# Patient Record
Sex: FEMALE | Race: WHITE | URBAN - METROPOLITAN AREA
[De-identification: names, ages, dates, MRNs, and addresses within clinical notes are randomized per-mention and may not be internally consistent; named-entity substitution may affect disease eponyms.]

---

## 2020-04-10 ENCOUNTER — VIRTUAL VISIT (OUTPATIENT)
Dept: FAMILY MEDICINE CLINIC | Age: 25
End: 2020-04-10

## 2020-04-10 VITALS — HEIGHT: 69 IN | DIASTOLIC BLOOD PRESSURE: 107 MMHG | SYSTOLIC BLOOD PRESSURE: 154 MMHG | HEART RATE: 99 BPM

## 2020-04-10 DIAGNOSIS — F90.9 HYPERACTIVE: ICD-10-CM

## 2020-04-10 DIAGNOSIS — F41.8 DEPRESSION WITH ANXIETY: ICD-10-CM

## 2020-04-10 DIAGNOSIS — Z76.89 ENCOUNTER TO ESTABLISH CARE: ICD-10-CM

## 2020-04-10 DIAGNOSIS — I10 ESSENTIAL HYPERTENSION: Primary | ICD-10-CM

## 2020-04-10 DIAGNOSIS — E66.09 OBESITY DUE TO EXCESS CALORIES WITH SERIOUS COMORBIDITY, UNSPECIFIED CLASSIFICATION: ICD-10-CM

## 2020-04-10 DIAGNOSIS — E16.2 HYPOGLYCEMIA: ICD-10-CM

## 2020-04-10 DIAGNOSIS — G89.29 CHRONIC MUSCULOSKELETAL PAIN: ICD-10-CM

## 2020-04-10 DIAGNOSIS — M79.18 CHRONIC MUSCULOSKELETAL PAIN: ICD-10-CM

## 2020-04-10 RX ORDER — CYCLOBENZAPRINE HCL 10 MG
TABLET ORAL
COMMUNITY
Start: 2020-03-28

## 2020-04-10 RX ORDER — LISINOPRIL 10 MG/1
10 TABLET ORAL DAILY
Qty: 90 TAB | Refills: 1 | Status: SHIPPED | OUTPATIENT
Start: 2020-04-10

## 2020-04-10 RX ORDER — NORGESTIMATE AND ETHINYL ESTRADIOL 0.25-0.035
KIT ORAL
COMMUNITY
Start: 2020-03-08

## 2020-04-10 NOTE — PROGRESS NOTES
Linh Bojorquez [facility] Note      Subjective:     Chief Complaint   Patient presents with   [facility].     Sudeep Aleman is a 31y.o. year old female who presents for virtual evaluation of the following:      Establishment of Care:  Previous PCP: Dr. Hodan Hernandez in [place], last seen > 1 year  No care team member to display   Dentist- Uli Vigil- Dr. Mishel Rowan      PMH:   Hypertension:  Chronic.  Diagnosed in childhood  Home monitoring:   Previous Treatment: Diltiazem 300mg- had joint heat with that, lisinopril with no known side effect  Not adhering to strict low salt diet  Mother and father with HTN  Co-morbidities: Obesity  BP Readings from Last 3 Encounters:   04/10/20 (!) 154/107     Fibromyalgia/ Chronic Pain:  Diagnosed 2013 by PCP  Current Sx: muscle pain, itching in foot, fatigue, pain in joint  Treatment: None  Previous Treatment: Name unknown  Triggers: extensive chores and physical exertion  ADL Assessment 4/10/2020   Feeding yourself No Help Needed   Getting from bed to chair No Help Needed   Getting dressed No Help Needed   Bathing or showering No Help Needed   Walk across the room (includes cane/walker) No Help Needed   Using the telphone No Help Needed   Taking your medications No Help Needed   Preparing meals No Help Needed   Managing money (expenses/bills) No Help Needed   Moderately strenuous housework (laundry) No Help Needed   Shopping for personal items (toiletries/medicines) No Help Needed   Shopping for groceries No Help Needed   Driving No Help Needed   Climbing a flight of stairs No Help Needed   Getting to places beyond walking distances No Help Needed         Depression/ Anxiety:   Diagnosed in childhood  Previous Tx: name unknown in [employer]  - has taken lorazepam last 1 year ago, previous use once per month  Has 3 panic attacks in the past 1 year, self limited  Therapist: None currently but has helped int he past  FALGUNI: 6  3 most recent PHQ Screens 4/10/2020   Little interest or pleasure in doing things Not at all   Feeling down, depressed, irritable, or hopeless Not at all   Total Score PHQ 2 0       ADHD:   Chronic  Diagnosed 2 years ago by PCP  Current Symptoms: fidgety, trouble sitting still  No neuropsychological testing  Previous Treatment: Strattera 25mg  - ran out 11/2019    Hypoglycemia:   Onset more than 2 years ago  Etiology unclear  Treatment: glucose tabs  - Has a service dog who monitor her sugars at night  Last low blood sugar 2 weeks ago 63 at night  Occasionally has low sugars during the day   Has passed out from sugars in the remote past  No blood glucose log available     Obesity:   Patient perception: \"its been coming down\"  Diet: less processed food  Dinner was tortellini with green beans  Lunch was grilled chicken sandwich and coldslaw  Breakfast yogurt  -No food log in office  Activity: None recently. Previous gym  Lost 50 in 6 months  Reports current weigh 250  Treatment:None  Previous Treatment: Name unknown  Last Weight Metrics:  Weight Loss Metrics 4/10/2020   Today's Wt -       Acute Concerns:  Medication refills      Social:   Employment- works night shift [employer]   Lives with boyfriend      Review of Systems   Pertinent positives and negative per HPI. All other systems  reviewed are negative for a Comprehensive ROS (10+).        Past Medical History:   Diagnosis Date    ADHD     Anxiety     Fibromyalgia     Hypertension     Hypoglycemia         Social History     Socioeconomic History    Marital status: SINGLE     Spouse name: Not on file    Number of children: Not on file    Years of education: Not on file    Highest education level: Not on file   Occupational History    Not on file   Social Needs    Financial resource strain: Not on file    Food insecurity     Worry: Not on file     Inability: Not on file    Transportation needs     Medical: Not on file     Non-medical: Not on file   Tobacco Use    Smoking status: Former Smoker     Types: Cigarettes     Last attempt to quit: 2013     Years since quittin.2    Smokeless tobacco: Never Used   Substance and Sexual Activity    Alcohol use: Yes     Frequency: 2-3 times a week     Drinks per session: 1 or 2    Drug use: Not on file    Sexual activity: Not on file   Lifestyle    Physical activity     Days per week: Not on file     Minutes per session: Not on file    Stress: Not on file   Relationships    Social connections     Talks on phone: Not on file     Gets together: Not on file     Attends Latter-day service: Not on file     Active member of club or organization: Not on file     Attends meetings of clubs or organizations: Not on file     Relationship status: Not on file    Intimate partner violence     Fear of current or ex partner: Not on file     Emotionally abused: Not on file     Physically abused: Not on file     Forced sexual activity: Not on file   Other Topics Concern    Not on file   Social History Narrative    Not on file       No family history on file. Current Outpatient Medications   Medication Sig    Sprintec, 28, 0.25-35 mg-mcg tab     cyclobenzaprine (FLEXERIL) 10 mg tablet      No current facility-administered medications for this visit. Objective:     Vitals:    04/10/20 1047 04/10/20 1130   BP: (!) 153/105 (!) 154/107   Pulse: 87 99   Height: 5' 9\" (1.753 m)      Vitals measurement visualized and verified by provider or nursing staff       Physical Examination:  General: Alert, cooperative, no distress, appears stated age. Obese  Eyes: Conjunctivae clear. Pupils equally round. Extraocular muscles intact. Ears: Normal appearing external ear   Nose: Nares normal appearing  Mouth/Throat: Lips, mucosa, and tongue normal. Moist mucous membranes. No tonsillar enlargement noted. Neck: Supple, symmetrical, trachea midline, no neck mass visualized. Respiratory: Breathing comfortably, in no acute respiratory distress. Cardiovascular: Visualized extremities without edema. MSK: Upper extremities normal appearing. Gait steady and unassisted. Skin: No significant erythematous lesions or discoloration noted on facial skin. No rashes or lesions on exposed skin. Neurologic: No facial asymmetry. Normal gaze. Cranial nerves intact. Psychiatric: Affect appropriate. Mood euthymic. Not hyperactivity on exam. Thoughts logical. Speech volume and speed normal. No hallucinations. Well kempt. No results found for any previous visit. Assessment/ Plan:   Diagnoses and all orders for this visit:    1. Essential hypertension  -     lisinopriL (PRINIVIL, ZESTRIL) 10 mg tablet; Take 1 Tab by mouth daily.  -     itembaseIntellinote BP FLOWSHEET  -     METABOLIC PANEL, COMPREHENSIVE  -     CBC WITH AUTOMATED DIFF    2. Encounter to establish care    3. Hypoglycemia  -     itembaseIntell[employer] GLUCOSE FLOWSHEET    4. Depression with anxiety    5. Hyperactive    6. Chronic musculoskeletal pain    7. Obesity due to excess calories with serious comorbidity, unspecified classification  -     [employer] WEIGHT MANAGEMENT      For today's visit, I did the following:  · Reviewed PMH as listed in orders  · Refilled meds for chronic conditions, per orders  · Reviewed labs in detail or ordered lab  · Requested or reviewed prior medical records- will mail medical records request fr to patient for completion  Labs to eval end organ function and etiology of chronic/acute concerns. Blood pressure is elevated. Add lisinopril 10mg. DASH Diet recommended. Recheck in 2 weeks. Diet and lifestyle modification encouraged for weight loss and chronic disease prevention/ management. Depression with anxiety stable with mild symptoms of anxiety. Encouraged counseling for mood disorder. Reported histoy of ADHD, no signs on exam without medication, without formal neuropsychological testing. Referred for testing. Caution with stimulants given HTN.     Chronic pain report of fibromyalgia, stable off medications. Avoid triggers. Negative depression screen. Referral to specialist for evaluation- neuropsychologist.       We discussed the expected course, resolution and complications of the diagnosis(es) in detail. Medication risks, benefits, costs, interactions, and alternatives were discussed as indicated. I advised her to contact the office if her condition worsens, changes or fails to improve as anticipated. She expressed understanding with the diagnosis(es) and plan. Sudeep Aleman is a 31 y.o. female being evaluated by a video visit encounter for concerns as above. A caregiver was present when appropriate. Due to this being a TeleHealth encounter (During HETQP-01 public health emergency), evaluation of the following organ systems was limited: Vitals/Constitutional/EENT/Resp/CV/GI//MS/Neuro/Skin/Heme-Lymph-Imm. Pursuant to the emergency declaration under the [address] [place] [address], [facility] waiver authority and the [employer] Act, this Virtual  Visit was conducted, with patient's (and/or legal guardian's) consent, to reduce the patient's risk of exposure to COVID-19 and provide necessary medical care. Services were provided through a video synchronous discussion virtually to substitute for in-person clinic visit. Provider was in the office while conducting this encounter. Patient was at home during encounter. Other persons participating in call: None  Consent:  She and/or her healthcare decision maker is aware that this patient-initiated Telehealth encounter is a billable service, with coverage as determined by her insurance carrier. She is aware that she may receive a bill and has provided verbal consent to proceed: Yes  This virtual visit was conducted via [address]      Total Time: minutes: 39.       Educated patient on red flag symptoms to warrant return to clinic or emergency room visit. I have discussed the diagnosis with the patient and the intended plan as seen in the above orders. The patient has been offered or received an after-visit summary and questions were answered concerning future plans. I have discussed medication side effects and warnings with the patient as well. Follow-up and Dispositions    · Return in about 2 weeks (around 4/24/2020) for Follow Up blood pressure.            Signed,    Lacho Hernandez MD  4/10/2020

## 2020-04-10 NOTE — PROGRESS NOTES
Identified pt with two pt identifiers(name and ). Reviewed record in preparation for visit and have obtained necessary documentation. Chief Complaint   Patient presents with   [address] Maintenance Due   Topic    HPV Age 9Y-34Y (1 - 2-dose series)    DTaP/Tdap/Td series (1 - Tdap)    PAP AKA CERVICAL CYTOLOGY         Visit Vitals  BP (!) 153/105 (BP 1 Location: Left arm, BP Patient Position: Sitting) Comment: pt reported   Pulse 87 Comment: pt reported   Ht 5' 9\" (1.753 m)   Wt 250 lb (113.4 kg) Comment: stated weight, no scale at home.  last weight was 1 week ago   LMP 2020   BMI 36.92 kg/m²     Pain Scale: 4/10

## 2020-04-12 NOTE — PATIENT INSTRUCTIONS
Weight Loss Tips: 
Remember this is like a part time job so your motivation and commitment is key to your success. Mindset Weight loss like any other behavior change starts in your mind. Think hard about what your motivates you to lose weight then meditate on that. Remind yourself of your motivation 
with phone alarms, scheduled meditation time, vision board, journal- just to name a few ideas. Have realistic goals. We expect with diligent healthy diet and physical activity you can lose 5% of your body weight in 3 
months. Wt in lbs x 0.05 = #lbs you should lose in 3 months. Make food and activity changes with a goal of CONSISTENCY not perfection. Food Start eating differently. Most of your weight loss and gain is from what you eat. Use small plates only Drink 2 liters (1/2 gallon) of water every day HALF of every meal should be fruit or vegetables Try meal prepping on Sunday (or your day off) with new different vegetables. Consider meal prep service such as Cle[employer], wepremeals. com Replace soda with diet soda or other zero sugar drinks (selter water just fine) Consider using the Myandb narciso for calorie counting. Goal 3419-7922 calories per day Activity Staying physically active will help you lose more weight and can help you get over the plateau when you weight just 
won't change any more with diet. Start exercise at least 5 days per week for 40 minutes. Consider Calastone training narciso for home exercises. You can start with walking. I suggest walking at a speed of at least 3.5-4.5mph to for the weight loss benefit. Increase your speed or distance every 2 weeks. Do some slow stretching daily of legs, arms and back. Consider adding weight training with light weights at home or at the gym. See a doctor or a physical training for 
instructions in order to avoid injuries from doing muscle training incorrectly.  
Free fitness program in [place]: [employer] org/program/fitness-warriors/ 
 
 
  
Learning About Mindfulness for Stress What are mindfulness and stress? Stress is what you feel when you have to handle more than you are used to. A lot of things can cause stress. You may feel stress when you go on a job interview, take a test, or run a race. This kind of short-term stress is normal and even useful. It can help you if you need to work hard or react quickly. Stress also can last a long time. Long-term stress is caused by stressful situations or events. Examples of long-term stress include long-term health problems, ongoing problems at work, and conflicts in your family. Long-term stress can harm your health. Mindfulness is a focus only on things happening in the present moment. It's a process of purposefully paying attention to and being aware of your surroundings, your emotions, your thoughts, and how your body feels. You are aware of these things, but you aren't judging these experiences as \"good\" or \"bad. \" Mindfulness can help you learn to calm your mind and body to help you cope with illness, pain, and stress. How does mindfulness help to relieve stress? Mindfulness can help quiet your mind and relax your body. Studies show that it can help some people sleep better, feel less anxious, and bring their blood pressure down. And it's been shown to help some people live and cope better with certain health problems like heart disease, depression, chronic pain, and cancer. How do you practice mindfulness? To be mindful is to pay attention, to be present, and to be accepting. · When you're mindful, you do just one thing and you pay close attention to that one thing. For example, you may sit quietly and notice your emotions or how your food tastes and smells. · When you're present, you focus on the things that are happening right now. You let go of your thoughts about the past and the future.  When you dwell on the past or the future, you miss moments that can heal and strengthen you. You may miss moments like hearing a child laugh or seeing a friendly face when you think you're all alone. · When you're accepting, you don't  the present moment. Instead you accept your thoughts and feelings as they come. You can practice anytime, anywhere, and in any way you choose. You can practice in many ways. Here are a few ideas: · While doing your chores, like washing the dishes, let your mind focus on what's in your hand. What does the dish feel like? Is the water warm or cold? · Go outside and take a few deep breaths. What is the air like? Is it warm or cold? · When you can, take some time at the start of your day to sit alone and think. · Take a slow walk by yourself. Count your steps while you breathe in and out. · Try yoga breathing exercises, stretches, and poses to strengthen and relax your muscles. · At work, if you can, try to stop for a few moments each hour. Note how your body feels. Let yourself regroup and let your mind settle before you return to what you were doing. · If you struggle with anxiety or \"worry thoughts,\" imagine your mind as a blue ronaldo and your worry thoughts as clouds. Now imagine those worry thoughts floating across your mind's ronaldo. Just let them pass by as you watch. Follow-up care is a key part of your treatment and safety. Be sure to make and go to all appointments, and call your doctor if you are having problems. It's also a good idea to know your test results and keep a list of the medicines you take. Where can you learn more? Go to http://enmanuel-amari.info/ Enter E135 in the search box to learn more about \"Learning About Mindfulness for Stress. \" Current as of: December 15, 2019Content Version: 12.4 © 0879-6584 Healthwise, Incorporated.  
Care instructions adapted under license by [employer] (which disclaims liability or warranty for this information). If you have questions about a medical condition or this instruction, always ask your healthcare professional. [address] any warranty or liability for your use of this information. DASH Diet: Care Instructions Your Care Instructions The DASH diet is an eating plan that can help lower your blood pressure. DASH stands for Dietary Approaches to Stop Hypertension. Hypertension is high blood pressure. The DASH diet focuses on eating foods that are high in calcium, potassium, and magnesium. These nutrients can lower blood pressure. The foods that are highest in these nutrients are fruits, vegetables, low-fat dairy products, nuts, seeds, and legumes. But taking calcium, potassium, and magnesium supplements instead of eating foods that are high in those nutrients does not have the same effect. The DASH diet also includes whole grains, fish, and poultry. The DASH diet is one of several lifestyle changes your doctor may recommend to lower your high blood pressure. Your doctor may also want you to decrease the amount of sodium in your diet. Lowering sodium while following the DASH diet can lower blood pressure even further than just the DASH diet alone. Follow-up care is a key part of your treatment and safety. Be sure to make and go to all appointments, and call your doctor if you are having problems. It's also a good idea to know your test results and keep a list of the medicines you take. How can you care for yourself at home? Following the DASH diet · Eat 4 to 5 servings of fruit each day. A serving is 1 medium-sized piece of fruit, ½ cup chopped or canned fruit, 1/4 cup dried fruit, or 4 ounces (½ cup) of fruit juice. Choose fruit more often than fruit juice. · Eat 4 to 5 servings of vegetables each day.  A serving is 1 cup of lettuce or raw leafy vegetables, ½ cup of chopped or cooked vegetables, or 4 ounces (½ cup) of vegetable juice. Choose vegetables more often than vegetable juice. · Get 2 to 3 servings of low-fat and fat-free dairy each day. A serving is 8 ounces of milk, 1 cup of yogurt, or 1 ½ ounces of cheese. · Eat 6 to 8 servings of grains each day. A serving is 1 slice of bread, 1 ounce of dry cereal, or ½ cup of cooked rice, pasta, or cooked cereal. Try to choose whole-grain products as much as possible. · Limit lean meat, poultry, and fish to 2 servings each day. A serving is 3 ounces, about the size of a deck of cards. · Eat 4 to 5 servings of nuts, seeds, and legumes (cooked dried beans, lentils, and split peas) each week. A serving is 1/3 cup of nuts, 2 tablespoons of seeds, or ½ cup of cooked beans or peas. · Limit fats and oils to 2 to 3 servings each day. A serving is 1 teaspoon of vegetable oil or 2 tablespoons of salad dressing. · Limit sweets and added sugars to 5 servings or less a week. A serving is 1 tablespoon jelly or jam, ½ cup sorbet, or 1 cup of lemonade. · Eat less than 2,300 milligrams (mg) of sodium a day. If you limit your sodium to 1,500 mg a day, you can lower your blood pressure even more. Tips for success · Start small. Do not try to make dramatic changes to your diet all at once. You might feel that you are missing out on your favorite foods and then be more likely to not follow the plan. Make small changes, and stick with them. Once those changes become habit, add a few more changes. · Try some of the following: ? Make it a goal to eat a fruit or vegetable at every meal and at snacks. This will make it easy to get the recommended amount of fruits and vegetables each day. ? Try yogurt topped with fruit and nuts for a snack or healthy dessert. ? Add lettuce, tomato, cucumber, and onion to sandwiches. ? Combine a ready-made pizza crust with low-fat mozzarella cheese and lots of vegetable toppings.  Try using tomatoes, squash, spinach, broccoli, carrots, cauliflower, and onions. ? Have a variety of cut-up vegetables with a low-fat dip as an appetizer instead of chips and dip. ? Sprinkle sunflower seeds or chopped almonds over salads. Or try adding chopped walnuts or almonds to cooked vegetables. ? Try some vegetarian meals using beans and peas. Add garbanzo or kidney beans to salads. Make burritos and tacos with mashed guadarrama beans or black beans. Where can you learn more? Go to http://enmanuelLingua.lyamari.info/ Enter Z767 in the search box to learn more about \"DASH Diet: Care Instructions. \" Current as of: December 15, 2019Content Version: 12.4 © 3822-7111 Healthwise, Incorporated. Care instructions adapted under license by [employer] (which disclaims liability or warranty for this information). If you have questions about a medical condition or this instruction, always ask your healthcare professional. [address] any warranty or liability for your use of this information.

## 2020-04-13 ENCOUNTER — TELEPHONE (OUTPATIENT)
Dept: FAMILY MEDICINE CLINIC | Age: 25
End: 2020-04-13

## 2023-05-26 RX ORDER — NORGESTIMATE AND ETHINYL ESTRADIOL 0.25-0.035
KIT ORAL
COMMUNITY
Start: 2020-03-08

## 2023-05-26 RX ORDER — CYCLOBENZAPRINE HCL 10 MG
TABLET ORAL
COMMUNITY
Start: 2020-03-28

## 2023-05-26 RX ORDER — LISINOPRIL 10 MG/1
10 TABLET ORAL DAILY
COMMUNITY
Start: 2020-04-10